# Patient Record
Sex: MALE | Race: WHITE
[De-identification: names, ages, dates, MRNs, and addresses within clinical notes are randomized per-mention and may not be internally consistent; named-entity substitution may affect disease eponyms.]

---

## 2017-07-12 ENCOUNTER — HOSPITAL ENCOUNTER (EMERGENCY)
Dept: HOSPITAL 45 - C.EDB | Age: 67
Discharge: HOME | End: 2017-07-12
Payer: COMMERCIAL

## 2017-07-12 VITALS — TEMPERATURE: 98.06 F

## 2017-07-12 VITALS
WEIGHT: 217.6 LBS | WEIGHT: 217.6 LBS | HEIGHT: 74.02 IN | BODY MASS INDEX: 27.93 KG/M2 | HEIGHT: 74.02 IN | BODY MASS INDEX: 27.93 KG/M2

## 2017-07-12 VITALS — DIASTOLIC BLOOD PRESSURE: 88 MMHG | HEART RATE: 61 BPM | SYSTOLIC BLOOD PRESSURE: 137 MMHG | OXYGEN SATURATION: 96 %

## 2017-07-12 DIAGNOSIS — I48.91: ICD-10-CM
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
## 2017-07-12 DIAGNOSIS — N18.9: ICD-10-CM

## 2017-07-12 DIAGNOSIS — Z51.81: ICD-10-CM

## 2017-07-12 DIAGNOSIS — R55: ICD-10-CM

## 2017-07-12 DIAGNOSIS — Z79.82: ICD-10-CM

## 2017-07-12 DIAGNOSIS — M25.532: ICD-10-CM

## 2017-07-12 DIAGNOSIS — Z79.01: ICD-10-CM

## 2017-07-12 DIAGNOSIS — S52.135A: ICD-10-CM

## 2017-07-12 DIAGNOSIS — R42: Primary | ICD-10-CM

## 2017-07-12 LAB
ALBUMIN/GLOB SERPL: 1.1 {RATIO} (ref 0.9–2)
ALP SERPL-CCNC: 96 U/L (ref 45–117)
ALT SERPL-CCNC: 23 U/L (ref 12–78)
ANION GAP SERPL CALC-SCNC: 9 MMOL/L (ref 3–11)
APPEARANCE UR: CLEAR
AST SERPL-CCNC: 21 U/L (ref 15–37)
BASOPHILS # BLD: 0.03 K/UL (ref 0–0.2)
BASOPHILS NFR BLD: 0.5 %
BILIRUB UR-MCNC: (no result) MG/DL
BUN SERPL-MCNC: 38 MG/DL (ref 7–18)
BUN/CREAT SERPL: 14.6 (ref 10–20)
CALCIUM SERPL-MCNC: 8.8 MG/DL (ref 8.5–10.1)
CHLORIDE SERPL-SCNC: 113 MMOL/L (ref 98–107)
CO2 SERPL-SCNC: 20 MMOL/L (ref 21–32)
COLOR UR: YELLOW
COMPLETE: YES
CREAT CL PREDICTED SERPL C-G-VRATE: 34.6 ML/MIN
CREAT SERPL-MCNC: 2.6 MG/DL (ref 0.6–1.4)
EOSINOPHIL NFR BLD AUTO: 201 K/UL (ref 130–400)
GLOBULIN SER-MCNC: 3.2 GM/DL (ref 2.5–4)
GLUCOSE SERPL-MCNC: 120 MG/DL (ref 70–99)
HCT VFR BLD CALC: 39.3 % (ref 42–52)
IG%: 0 %
IMM GRANULOCYTES NFR BLD AUTO: 24.1 %
INR PPP: 2.6 (ref 0.9–1.1)
LYMPHOCYTES # BLD: 1.51 K/UL (ref 1.2–3.4)
MANUAL MICROSCOPIC REQUIRED?: NO
MCH RBC QN AUTO: 30 PG (ref 25–34)
MCHC RBC AUTO-ENTMCNC: 34.1 G/DL (ref 32–36)
MCV RBC AUTO: 87.9 FL (ref 80–100)
MONOCYTES NFR BLD: 6.7 %
NEUTROPHILS # BLD AUTO: 3.2 %
NEUTROPHILS NFR BLD AUTO: 65.5 %
NITRITE UR QL STRIP: (no result)
PH UR STRIP: 5.5 [PH] (ref 4.5–7.5)
PMV BLD AUTO: 10.7 FL (ref 7.4–10.4)
POTASSIUM SERPL-SCNC: 4.4 MMOL/L (ref 3.5–5.1)
PROTHROMBIN TIME: 29.4 SECONDS (ref 9–12)
RBC # BLD AUTO: 4.47 M/UL (ref 4.7–6.1)
REVIEW REQ?: NO
SODIUM SERPL-SCNC: 142 MMOL/L (ref 136–145)
SP GR UR STRIP: 1.02 (ref 1–1.03)
URINE BILL WITH OR WITHOUT MIC: (no result)
URINE EPITHELIAL CELL AUTO: (no result) /LPF (ref 0–5)
UROBILINOGEN UR-MCNC: (no result) MG/DL
WBC # BLD AUTO: 6.27 K/UL (ref 4.8–10.8)
ZZUR CULT IF INDIC CLEAN CATCH: NO

## 2017-07-12 NOTE — DIAGNOSTIC IMAGING REPORT
CHEST ONE VIEW PORTABLE



CLINICAL HISTORY: 66 years-old Male presenting with trauma, near syncope. 



TECHNIQUE: Portable upright AP view of the chest was obtained.



COMPARISON:  None.



FINDINGS:

Left-sided pacer with leads to the right atrium and right ventricular apex. Mild

prominence of the cardiac silhouette. Mild tortuosity of the descending thoracic

aorta. Lungs and pleural spaces clear. Osseous structures normal.

Cholecystectomy clips noted.



IMPRESSION:

1.  No acute intrathoracic injury. 







Electronically signed by:  Brandon Logan M.D.

7/12/2017 7:31 PM



Dictated Date/Time:  7/12/2017 7:29 PM

## 2017-07-12 NOTE — EMERGENCY ROOM VISIT NOTE
History


Report prepared by Dylan:  Kushal Daley


Under the Supervision of:  MILENA De SantiagoO.


First contact with patient:  18:48


Chief Complaint:  DIZZY


Stated Complaint:  LEFT WRIST TO ELBOW INJURY-


Nursing Triage Summary:  


pt reports missed a step when walking down stairs landing on L fore arm , pain 


from wrist to elbow , denies striking head or LOC





while sitting in weigh chair pt became pale clammy and states Im going to pass 


out , pt assisted to wc and to A9





History of Present Illness


The patient is a 66 year old male who presents to the Emergency Room with 

complaints of constant, tingling and pain to his left hand beginning around 3 

hours ago. The patient states that he was going up the stairs and missed a 

step. He reports that used his hands to break his fall, and he landed on his 

hands and knees. The patient notes that his left hand took most of the fall. He 

states that he went to dinner afterwards and then returned to work. The patient 

reports that he works at as a , and he came to the ED because he 

could not stand the pain while working. He notes that he experienced diaphoresis

, dizziness, and near syncope. The patient states that his right arm hurts from 

his hand all the way to his elbow. He reports that he felt fine before the 

fall. The patient denies shoulder pain, neck pain, back pain, hitting his head, 

and loss of consciousness. He notes that he did drink a soda 2 hours ago 

because he gets dehydrated easily. The patient states that he has a history of 

a cholecystectomy, pace maker, and a stent. The nurse reports that the patient 

was sitting in the waiting room and had another near syncopal episode.  States 

pacemaker checked by cardiologist last week.





   Source of History:  patient, nursing staff


   Onset:  about 3 hours ago


   Position:  hand (left)


   Quality:  tingling


   Timing:  constant


   Associated Symptoms:  + diaphoresis, No LOC, No neck pain, No back pain


Note:


Associated symptoms: dizziness and near loss of consciousness


Denies: shoulder pain and hitting his head





Review of Systems


See HPI for pertinent positives & negatives. A total of 10 systems reviewed and 

were otherwise negative.





Past Medical & Surgical


Medical Problems:


(1) Atrial fibrillation








Social History


Smoking Status:  Never Smoker


Marital Status:  


Housing Status:  lives with family


Occupation Status:  employed





Current/Historical Medications


Scheduled


Amiodarone Hcl (Cordarone), 200 MG PO DAILY


Amlodipine (Norvasc), 5 MG PO DAILY


Aspirin (Aspirin Ec), 81 MG PO DAILY


Cholecalciferol (Vitamin D3), 1,000 UNITS PO DAILY


Cyanocobalamin (Vitamin B-12), 500 MCG PO DAILY


Levothyroxine Sodium (Levothyroxine Sodium), 25 MCG PO DAILY


Metoprolol Succinate (Toprol Xl), 1 TAB PO DAILY


Omeprazole (Prilosec), 20 MG PO QPM


Simvastatin (Zocor), 20 MG PO QPM


Warfarin Sod (Coumadin), 1 TAB PO 5XWK


Warfarin Sod (Coumadin), 1.25 MG PO 2XWK





Scheduled PRN


Acetaminophen (Tylenol), 650 MG PO Q4H PRN for Pain or Fever


Oxycodone/Acetaminophen 5MG/325MG (Percocet 5MG/325MG), 1 TAB PO Q6 PRN for Pain





Allergies


Coded Allergies:  


     No Known Allergies (Verified , 7/12/17)





Physical Exam


Vital Signs











  Date Time  Temp Pulse Resp B/P (MAP) Pulse Ox O2 Delivery O2 Flow Rate FiO2


 


7/12/17 23:04  61 18 137/88 96   


 


7/12/17 21:21  59  139/98    


 


7/12/17 21:21  60 18 139/98 98 Room Air  


 


7/12/17 19:16  62 21  97   


 


7/12/17 19:11  60 19  97   


 


7/12/17 19:06  60 17  97   


 


7/12/17 19:01  60 21 119/83 97   


 


7/12/17 18:56  60 22  96   


 


7/12/17 18:51  60 15  95   


 


7/12/17 18:48    118/81    


 


7/12/17 18:46  60 16  96   


 


7/12/17 18:41  60 19  96   


 


7/12/17 18:36  60 18  96   


 


7/12/17 18:35  60      


 


7/12/17 18:31    118/81    


 


7/12/17 18:29  62 22 105/77 96 Room Air  


 


7/12/17 18:20 36.7 62 18  97 Room Air  











Physical Exam


GENERAL: alert, well appearing, well nourished, no distress, non-toxic 


EYE EXAM: normal conjunctiva, PERRL and EOM's grossly intact


OROPHARYNX: no exudate, no erythema, lips, buccal mucosa, and tongue normal and 

mucous membranes are moist


NECK: supple, no nuchal rigidity, no adenopathy, non-tender


LUNGS: Clear to auscultation. Normal chest wall mechanics


HEART: no murmurs, S1 normal and S2 normal, pace maker noted to the left 

anterior, superior chest wall


ABDOMEN: abdomen soft, non-tender, normo-active bowel sounds, no masses, no 

rebound or guarding. 


BACK: Back is symmetrical on inspection and there is no deformity, no midline 

tenderness, no CVA tenderness. 


SKIN: no rashes and no bruising 


UPPER EXTREMITIES: Pain and edema to the left hand, pulse, motor, sensation 

intact. Decrease ROM secondary to pain, no pain or deformity to elbow and 

proximal joint. 


LOWER EXTREMITIES: No pitting edema. Mild abrasion to the left knee


NEURO EXAM: Normal sensorium, cranial nerves II-XII grossly intact, normal 

speech,  no gross weakness of arms, no gross weakness of legs.





Medical Decision & Procedures


ER Provider


Diagnostic Interpretation:


Radiology results have been interpreted by the radiologist and reviewed by me.





LEFT WRIST MIN 3 VIEWS ROUTINE





CLINICAL HISTORY: Left wrist pain following trauma.    





COMPARISON: None





FINDINGS:  Alignment of the left carpal bones is anatomic. There is no acute


fracture. Deformity of the distal left radius is chronic.





IMPRESSION: No acute fracture or dislocation of the left wrist.





Electronically signed by:  Vincent Tejeda M.D.


7/12/2017 7:34 PM





Dictated Date/Time:  7/12/2017 7:32 PM





LEFT ELBOW MIN 3 VIEWS ROUTINE





CLINICAL HISTORY: Left elbow pain following fall.    





COMPARISON: None





FINDINGS:  Alignment of left elbow is anatomic. There is spurring of the


olecranon at the insertion of the triceps. There is no definite evidence for a


left elbow joint effusion. However, there is subtle cortical irregularity of the


left radial neck. This suggests a slightly impacted fracture. No additional


fractures are identified.





IMPRESSION: Suspected minimally impacted left radial neck fracture.





Electronically signed by:  Vincent Tejeda M.D.


7/12/2017 7:32 PM





Dictated Date/Time:  7/12/2017 7:31 PM





CHEST ONE VIEW PORTABLE





CLINICAL HISTORY: 66 years-old Male presenting with trauma, near syncope. 





TECHNIQUE: Portable upright AP view of the chest was obtained.





COMPARISON:  None.





FINDINGS:


Left-sided pacer with leads to the right atrium and right ventricular apex. Mild


prominence of the cardiac silhouette. Mild tortuosity of the descending thoracic


aorta. Lungs and pleural spaces clear. Osseous structures normal.


Cholecystectomy clips noted.





IMPRESSION:


1.  No acute intrathoracic injury. 





Electronically signed by:  Brandon Logan M.D.


7/12/2017 7:31 PM





Dictated Date/Time:  7/12/2017 7:29 PM





RENAL ULTRASOUND





CLINICAL HISTORY: Acute renal failure.    





COMPARISON STUDY:  None.





TECHNIQUE:  Sonography of the kidneys and the urinary bladder was performed.





FINDINGS: The right kidney measures 9.6 x 4.5 x 4.9 cm and the left measures


10.4 x 5.1 x 4 cm. The kidneys are echogenic. There is moderate to marked renal


cortical thinning. There is no hydronephrosis. A 1.3 cm right renal cyst is


noted. Neither ureteral jet was identified. The bladder was otherwise normal.





IMPRESSION: 


1. No hydronephrosis.


2. Echogenic kidneys with moderate to marked bilateral renal cortical thinning.





Electronically signed by:  Vincent Tejeda M.D.


7/12/2017 9:55 PM





Dictated Date/Time:  7/12/2017 9:54 PM





Laboratory Results


7/12/17 18:30








Red Blood Count 4.47, Mean Corpuscular Volume 87.9, Mean Corpuscular Hemoglobin 

30.0, Mean Corpuscular Hemoglobin Concent 34.1, Mean Platelet Volume 10.7, 

Neutrophils (%) (Auto) 65.5, Lymphocytes (%) (Auto) 24.1, Monocytes (%) (Auto) 

6.7, Eosinophils (%) (Auto) 3.2, Basophils (%) (Auto) 0.5, Neutrophils # (Auto) 

4.11, Lymphocytes # (Auto) 1.51, Monocytes # (Auto) 0.42, Eosinophils # (Auto) 

0.20, Basophils # (Auto) 0.03





7/12/17 18:30

















Test


  7/12/17


18:30 7/12/17


21:25 7/12/17


22:16


 


White Blood Count


  6.27 K/uL


(4.8-10.8) 


  


 


 


Red Blood Count


  4.47 M/uL


(4.7-6.1) 


  


 


 


Hemoglobin


  13.4 g/dL


(14.0-18.0) 


  


 


 


Hematocrit 39.3 % (42-52)   


 


Mean Corpuscular Volume


  87.9 fL


() 


  


 


 


Mean Corpuscular Hemoglobin


  30.0 pg


(25-34) 


  


 


 


Mean Corpuscular Hemoglobin


Concent 34.1 g/dl


(32-36) 


  


 


 


Platelet Count


  201 K/uL


(130-400) 


  


 


 


Mean Platelet Volume


  10.7 fL


(7.4-10.4) 


  


 


 


Neutrophils (%) (Auto) 65.5 %   


 


Lymphocytes (%) (Auto) 24.1 %   


 


Monocytes (%) (Auto) 6.7 %   


 


Eosinophils (%) (Auto) 3.2 %   


 


Basophils (%) (Auto) 0.5 %   


 


Neutrophils # (Auto)


  4.11 K/uL


(1.4-6.5) 


  


 


 


Lymphocytes # (Auto)


  1.51 K/uL


(1.2-3.4) 


  


 


 


Monocytes # (Auto)


  0.42 K/uL


(0.11-0.59) 


  


 


 


Eosinophils # (Auto)


  0.20 K/uL


(0-0.5) 


  


 


 


Basophils # (Auto)


  0.03 K/uL


(0-0.2) 


  


 


 


RDW Standard Deviation


  46.2 fL


(36.4-46.3) 


  


 


 


RDW Coefficient of Variation


  14.3 %


(11.5-14.5) 


  


 


 


Immature Granulocyte % (Auto) 0.0 %   


 


Immature Granulocyte # (Auto)


  0.00 K/uL


(0.00-0.02) 


  


 


 


Prothrombin Time


  29.4 SECONDS


(9.0-12.0) 


  


 


 


Prothromb Time International


Ratio 2.6 (0.9-1.1) 


  


  


 


 


Anion Gap


  9.0 mmol/L


(3-11) 


  


 


 


Est Creatinine Clear Calc


Drug Dose 34.6 ml/min 


  


  


 


 


Estimated GFR (


American) 28.3 


  


  


 


 


Estimated GFR (Non-


American 24.4 


  


  


 


 


BUN/Creatinine Ratio 14.6 (10-20)   


 


Calcium Level


  8.8 mg/dl


(8.5-10.1) 


  


 


 


Total Bilirubin


  0.4 mg/dl


(0.2-1) 


  


 


 


Aspartate Amino Transf


(AST/SGOT) 21 U/L (15-37) 


  


  


 


 


Alanine Aminotransferase


(ALT/SGPT) 23 U/L (12-78) 


  


  


 


 


Alkaline Phosphatase


  96 U/L


() 


  


 


 


Troponin I


  < 0.015 ng/ml


(0-0.045) 


  


 


 


Pro-B-Type Natriuretic Peptide


  221 pg/ml


(0-900) 


  


 


 


Total Protein


  6.8 gm/dl


(6.4-8.2) 


  


 


 


Albumin


  3.6 gm/dl


(3.4-5.0) 


  


 


 


Globulin


  3.2 gm/dl


(2.5-4.0) 


  


 


 


Albumin/Globulin Ratio 1.1 (0.9-2)   


 


Urine Color  YELLOW  


 


Urine Appearance  CLEAR (CLEAR)  


 


Urine pH  5.5 (4.5-7.5)  


 


Urine Specific Gravity


  


  1.020


(1.000-1.030) 


 


 


Urine Protein  NEG (NEG)  


 


Urine Glucose (UA)  NEG (NEG)  


 


Urine Ketones  TRACE (NEG)  


 


Urine Occult Blood  NEG (NEG)  


 


Urine Nitrite  NEG (NEG)  


 


Urine Bilirubin  NEG (NEG)  


 


Urine Urobilinogen  NEG (NEG)  


 


Urine Leukocyte Esterase  SMALL (NEG)  


 


Urine WBC (Auto)


  


  5-10 /hpf


(0-5) 


 


 


Urine RBC (Auto)  0-4 /hpf (0-4)  


 


Urine Hyaline Casts (Auto)  1-5 /lpf (0-5)  


 


Urine Epithelial Cells (Auto)


  


  5-10 /lpf


(0-5) 


 


 


Urine Bacteria (Auto)  NEG (NEG)  


 


Bedside Troponin I


  


  


  < 0.030 ng/ml


(0-0.045)





Laboratory results per my review.





Medications Administered











 Medications


  (Trade)  Dose


 Ordered  Sig/Mackenzie


 Route  Start Time


 Stop Time Status Last Admin


Dose Admin


 


 Ondansetron HCl


  (Zofran Inj)  4 mg  NOW  STAT


 IV  7/12/17 20:48


 7/12/17 20:50 DC 7/12/17 21:19


4 MG


 


 Oxycodone/


 Acetaminophen


  (Percocet


 5-325mg Tab)  1 tab  NOW  ONCE


 PO  7/12/17 21:00


 7/12/17 21:01 DC 7/12/17 21:21


1 TAB











ECG


Indication:  syncope (near)


Rate (beats per minute):  62


Rhythm:  other (pace maker)


Findings:  no acute ischemic change, other (Normal QRs and QTs)





ED Course


1852: The patient was evaluated in room A09B. A complete history and physical 

exam was performed. 





2045: I updated the patient on his exam findings and test results. He states 

that he does follow up with a kidney specialist. His last kidney number was 

2.3. He is still having pain in the wrist.





2048: Ordered Zofran Inj 4 mg IV





2100: Ordered Oxycodone/Acetaminophen 1 tab PO





2250: Upon reevaluation, the patient is feeling better. I discussed the 

findings and the treatment plan with the patient.  She verbalizes agreement and 

understanding.  The patient was discharged home.





Medical Decision


Differential diagnoses include major intracranial, cervical, spinal, thoracic, 

abdominal, pelvic and neurologic injury.  Fracture, contusion, sprain, strain, 

laceration, abrasions included as well. 





Medication Reconciliation: I attest that I have personally reviewed the patient'

s current medication list.





Blood pressure screening: Patient was found to have a slightly elevated blood 

pressure due to circumstances. I do not believe that the patient requires 

hypertension monitoring.





Patient well-appearing here, labs and imaging reassuring.  Patient made aware 

of possible subtle fracture noted on x-rays by radiology and placed in a splint 

sling as a precaution.  Patient advised to follow closely with or so.  Patient 

advised to follow-up with family doctor as well as cardiology given near-

syncopal event today.  No dysrhythmias noted on telemetry and pacemaker spikes 

noted.  Doubt acute dysfunction of patient's device.  Patient with no recurrent 

symptoms while here, was ambulatory with a steady gait and tolerating by mouth 

at bedside.  Discussed symptoms possibly due to dehydration, pain response.  

Urine specimen suboptimal, patient with no  symptoms doubt acute UTI.  

Troponins times to both negative, doubt ACS.  Doubt dissection, tamponade, PE.  

Patient's INR therapeutic.  Doubt occult infectious etiology, bacteremia/

sepsis.  Doubt perforation, GI bleed.  Doubt mesenteric ischemia.  Patient with 

chronic kidney disease, or racing nephrology and aware of his baseline 

creatinine.  Discussed creatinine level today, and he would like to follow-up 

with his nephrologist as an outpatient.  No other changes noted on ultrasound, 

doubt related to UTI or obstructive uropathy.  Patient with localized pain at 

the right wrist secondary to fall on an outstretched hand.  Discussed with 

patient at bedside symptoms to watch and return for, he verbalized 

understanding was agreeable with plan.





Impression





 Primary Impression:  


 Dizziness


 Additional Impressions:  


 Near syncope


 Fall


 Left wrist pain


 Fracture of radial neck, left, closed


 Chronic kidney disease





Scribe Attestation


The scribe's documentation has been prepared under my direction and personally 

reviewed by me in its entirety. I confirm that the note above accurately 

reflects all work, treatment, procedures, and medical decision making performed 

by me.





Departure Information


Dispostion


Home / Self-Care





Prescriptions





Oxycodone/Acetaminophen 5MG/325MG (PERCOCET 5MG/325MG)  Tab


1 TAB PO Q6 Y for Pain, #20 TAB


   Prov: Shayla Guzman,          7/12/17





Referrals


Minerva Trent M.D. (PCP)





Patient Instructions


My [facility]





Additional Instructions





Please call and follow-up with the orthopedic surgeon and with your family 

doctor and kidney doctor.  You may use the pain medicine as prescribed.  Please 

don't take it and drive.  If you have any worsening pain, swelling,numbness/

tingling in the injured arm, develop nausea, chest pain, trouble breathing, 

vomiting, headaches, vision changes, or you have any other new or concerning 

symptoms, please return to the emergency room.





Problem Qualifiers








 Additional Impressions:  


 Fall


 Encounter type:  initial encounter  Qualified Codes:  W19.XXXA - Unspecified 

fall, initial encounter


 Fracture of radial neck, left, closed


 Encounter type:  initial encounter  Fracture alignment:  nondisplaced  

Qualified Codes:  S52.135A - Nondisplaced fracture of neck of left radius, 

initial encounter for closed fracture


 Chronic kidney disease


 Chronic kidney disease stage:  unspecified stage  Qualified Codes:  N18.9 - 

Chronic kidney disease, unspecified

## 2017-07-12 NOTE — DIAGNOSTIC IMAGING REPORT
RENAL ULTRASOUND



CLINICAL HISTORY: Acute renal failure.    



COMPARISON STUDY:  None.



TECHNIQUE:  Sonography of the kidneys and the urinary bladder was performed.



FINDINGS: The right kidney measures 9.6 x 4.5 x 4.9 cm and the left measures

10.4 x 5.1 x 4 cm. The kidneys are echogenic. There is moderate to marked renal

cortical thinning. There is no hydronephrosis. A 1.3 cm right renal cyst is

noted. Neither ureteral jet was identified. The bladder was otherwise normal.



IMPRESSION: 



1. No hydronephrosis.



2. Echogenic kidneys with moderate to marked bilateral renal cortical thinning.







Electronically signed by:  Vincent Tejeda M.D.

7/12/2017 9:55 PM



Dictated Date/Time:  7/12/2017 9:54 PM

## 2017-07-12 NOTE — DIAGNOSTIC IMAGING REPORT
LEFT WRIST MIN 3 VIEWS ROUTINE



CLINICAL HISTORY: Left wrist pain following trauma.    



COMPARISON: None



FINDINGS:  Alignment of the left carpal bones is anatomic. There is no acute

fracture. Deformity of the distal left radius is chronic.



IMPRESSION: No acute fracture or dislocation of the left wrist.







Electronically signed by:  Vincent Tejeda M.D.

7/12/2017 7:34 PM



Dictated Date/Time:  7/12/2017 7:32 PM

## 2017-07-12 NOTE — DIAGNOSTIC IMAGING REPORT
LEFT ELBOW MIN 3 VIEWS ROUTINE



CLINICAL HISTORY: Left elbow pain following fall.    



COMPARISON: None



FINDINGS:  Alignment of left elbow is anatomic. There is spurring of the

olecranon at the insertion of the triceps. There is no definite evidence for a

left elbow joint effusion. However, there is subtle cortical irregularity of the

left radial neck. This suggests a slightly impacted fracture. No additional

fractures are identified.



IMPRESSION: Suspected minimally impacted left radial neck fracture.







Electronically signed by:  Vincent Tejeda M.D.

7/12/2017 7:32 PM



Dictated Date/Time:  7/12/2017 7:31 PM

## 2018-08-15 ENCOUNTER — HOSPITAL ENCOUNTER (OUTPATIENT)
Dept: HOSPITAL 45 - C.LAB1850 | Age: 68
Discharge: HOME | End: 2018-08-15
Attending: PHYSICIAN ASSISTANT
Payer: COMMERCIAL

## 2018-08-15 DIAGNOSIS — E05.90: Primary | ICD-10-CM

## 2018-08-17 ENCOUNTER — HOSPITAL ENCOUNTER (OUTPATIENT)
Dept: HOSPITAL 45 - C.ULTR | Age: 68
Discharge: HOME | End: 2018-08-17
Attending: PHYSICIAN ASSISTANT
Payer: COMMERCIAL

## 2018-08-17 DIAGNOSIS — E05.90: Primary | ICD-10-CM

## 2018-08-17 NOTE — DIAGNOSTIC IMAGING REPORT
SOFT TISS HEAD/NECK-THYROID



CLINICAL HISTORY: 67 years-old Male presenting with HYPERTHYROIDISM,

THYROIDITIS. 



TECHNIQUE: Real-time grayscale and color Doppler ultrasound imaging of the

thyroid and base of the neck was performed.



COMPARISON: CT chest from 6/3/2018.



FINDINGS:



Right lobe: Normal echogenicity and echotexture. The right lobe of the thyroid

measures 4.2 x 1.9 x 1.8 cm. No parenchymal hyperemia. No nodules. 



Left lobe: Normal echogenicity and echotexture. The left lobe of the thyroid

measures 4.5 x 1.5 x 2.2 cm. No parenchymal hyperemia. Index nodule(s)

enumerated below:      

1.  0.4 x 0.3 x 0.4 cm interpolar cystic anechoic wider-than-tall nodule with

smooth margins. Large comet-tail artifacts suggesting colloid. TI-RADS 1:

Benign.    



Isthmus: The isthmus measures 6 mm in thickness. No parenchymal hyperemia. No

nodules.





Reference:

TI-RADS 1: No biopsy.

TI-RADS 2: No biopsy.

TI-RADS 3: FNA if > or = to 2.5 cm; follow if > or = to 1.5 cm.

TI-RADS 4: FNA if > or = to 1.5 cm; follow if > or = to 1 cm.

TI-RADS 5: FNA if > or = to 1 cm; follow if > or = to 0.5 cm.



Nuvia POWELL, Samanta WD, Abdi TEIXEIRA. Thyroid Imaging Reporting and Data System

(TI-RADS): A User's Guide. Radiology. 2018;287:29-36.

Abdi LAZO, Nuvia OPWELL, Darrin ZAMAN, et al. Thyroid Ultrasound Reporting Lexicon:

White Paper of the ACR Thyroid Imaging, Reporting and Data System (TIRADS)

Committee. J Am Donya Radiol. 2015;132384-1143.





IMPRESSION:  

Essentially normal thyroid ultrasound with a benign subcentimeter left thyroid

lobe cyst. 







Electronically signed by:  Brandon Logan M.D.

8/17/2018 8:08 AM



Dictated Date/Time:  8/17/2018 8:06 AM

## 2018-08-19 LAB
THYROPEROXIDASE AB SERPL-ACNC: <1 IU/ML (ref ?–9)
TSI ACT/NOR SER: <89 % BASELINE (ref ?–140)